# Patient Record
Sex: MALE | Race: WHITE | HISPANIC OR LATINO | URBAN - METROPOLITAN AREA
[De-identification: names, ages, dates, MRNs, and addresses within clinical notes are randomized per-mention and may not be internally consistent; named-entity substitution may affect disease eponyms.]

---

## 2019-11-22 ENCOUNTER — IMPORTED ENCOUNTER (OUTPATIENT)
Dept: URBAN - METROPOLITAN AREA CLINIC 50 | Facility: CLINIC | Age: 29
End: 2019-11-22

## 2019-11-22 NOTE — PATIENT DISCUSSION
"""Start Cool compresses both eyes twice a day ."" ""Start Artificial tears both eyes two - four times a day ."" ""Start Zaditor both eyes twice a day . """

## 2021-03-15 ENCOUNTER — IMPORTED ENCOUNTER (OUTPATIENT)
Dept: URBAN - METROPOLITAN AREA CLINIC 50 | Facility: CLINIC | Age: 31
End: 2021-03-15

## 2021-04-17 ASSESSMENT — TONOMETRY
OD_IOP_MMHG: 12
OS_IOP_MMHG: 13

## 2021-04-17 ASSESSMENT — VISUAL ACUITY
OD_CC: 20/25-1
OS_CC: J1+@ 18 IN
OS_CC: 20/20
OD_CC: J1+@ 18 IN

## 2022-06-03 ENCOUNTER — PREPPED CHART (OUTPATIENT)
Dept: URBAN - METROPOLITAN AREA CLINIC 52 | Facility: CLINIC | Age: 32
End: 2022-06-03

## 2022-06-06 ENCOUNTER — ESTABLISHED PATIENT (OUTPATIENT)
Dept: URBAN - METROPOLITAN AREA CLINIC 52 | Facility: CLINIC | Age: 32
End: 2022-06-06

## 2022-06-06 DIAGNOSIS — H17.9: ICD-10-CM

## 2022-06-06 DIAGNOSIS — H10.13: ICD-10-CM

## 2022-06-06 PROCEDURE — 92014 COMPRE OPH EXAM EST PT 1/>: CPT

## 2022-06-06 RX ORDER — OLOPATADINE HYDROCHLORIDE 2 MG/ML
1 SOLUTION OPHTHALMIC EVERY MORNING
Start: 2022-06-06

## 2022-06-06 ASSESSMENT — VISUAL ACUITY
OS_SC: 20/20-1
OU_SC: 20/20
OD_SC: 20/20-1
OU_SC: J1+@14IN

## 2022-06-06 ASSESSMENT — PACHYMETRY
OS_CT_UM: 535
OD_CT_UM: 593

## 2022-06-06 ASSESSMENT — TONOMETRY
OS_IOP_MMHG: 10
OD_IOP_MMHG: 8
OS_IOP_MMHG: 9
OD_IOP_MMHG: 4